# Patient Record
Sex: MALE | Race: WHITE | NOT HISPANIC OR LATINO | Employment: STUDENT | URBAN - METROPOLITAN AREA
[De-identification: names, ages, dates, MRNs, and addresses within clinical notes are randomized per-mention and may not be internally consistent; named-entity substitution may affect disease eponyms.]

---

## 2017-08-16 DIAGNOSIS — N50.89 TESTICULAR MASS: Primary | ICD-10-CM

## 2017-08-18 ENCOUNTER — HOSPITAL ENCOUNTER (OUTPATIENT)
Dept: RADIOLOGY | Facility: HOSPITAL | Age: 27
Discharge: HOME | End: 2017-08-18
Attending: FAMILY MEDICINE
Payer: MEDICAID

## 2017-08-18 DIAGNOSIS — N50.89 TESTICULAR MASS: ICD-10-CM

## 2017-08-18 PROCEDURE — 76870 US EXAM SCROTUM: CPT | Mod: TC

## 2021-04-09 ENCOUNTER — IMMUNIZATION (OUTPATIENT)
Dept: INTERNAL MEDICINE | Facility: CLINIC | Age: 31
End: 2021-04-09

## 2021-04-09 DIAGNOSIS — Z23 NEED FOR VACCINATION: Primary | ICD-10-CM

## 2021-04-09 PROCEDURE — 91300 COVID-19, MRNA, LNP-S, PF, 30 MCG/0.3 ML DOSE VACCINE: ICD-10-PCS | Mod: S$GLB,,, | Performed by: FAMILY MEDICINE

## 2021-04-09 PROCEDURE — 0001A COVID-19, MRNA, LNP-S, PF, 30 MCG/0.3 ML DOSE VACCINE: ICD-10-PCS | Mod: CV19,S$GLB,, | Performed by: FAMILY MEDICINE

## 2021-04-09 PROCEDURE — 91300 COVID-19, MRNA, LNP-S, PF, 30 MCG/0.3 ML DOSE VACCINE: CPT | Mod: S$GLB,,, | Performed by: FAMILY MEDICINE

## 2021-04-09 PROCEDURE — 0001A COVID-19, MRNA, LNP-S, PF, 30 MCG/0.3 ML DOSE VACCINE: CPT | Mod: CV19,S$GLB,, | Performed by: FAMILY MEDICINE

## 2021-04-30 ENCOUNTER — IMMUNIZATION (OUTPATIENT)
Dept: INTERNAL MEDICINE | Facility: CLINIC | Age: 31
End: 2021-04-30

## 2021-04-30 DIAGNOSIS — Z23 NEED FOR VACCINATION: Primary | ICD-10-CM

## 2021-04-30 PROCEDURE — 91300 COVID-19, MRNA, LNP-S, PF, 30 MCG/0.3 ML DOSE VACCINE: CPT | Mod: ,,, | Performed by: FAMILY MEDICINE

## 2021-04-30 PROCEDURE — 0002A COVID-19, MRNA, LNP-S, PF, 30 MCG/0.3 ML DOSE VACCINE: ICD-10-PCS | Mod: CV19,,, | Performed by: FAMILY MEDICINE

## 2021-04-30 PROCEDURE — 91300 COVID-19, MRNA, LNP-S, PF, 30 MCG/0.3 ML DOSE VACCINE: ICD-10-PCS | Mod: ,,, | Performed by: FAMILY MEDICINE

## 2021-04-30 PROCEDURE — 0002A COVID-19, MRNA, LNP-S, PF, 30 MCG/0.3 ML DOSE VACCINE: CPT | Mod: CV19,,, | Performed by: FAMILY MEDICINE

## 2021-11-15 ENCOUNTER — OFFICE VISIT (OUTPATIENT)
Dept: INTERNAL MEDICINE | Facility: CLINIC | Age: 31
End: 2021-11-15
Payer: COMMERCIAL

## 2021-11-15 VITALS
BODY MASS INDEX: 24.17 KG/M2 | HEART RATE: 81 BPM | TEMPERATURE: 97 F | RESPIRATION RATE: 16 BRPM | SYSTOLIC BLOOD PRESSURE: 118 MMHG | WEIGHT: 159.5 LBS | DIASTOLIC BLOOD PRESSURE: 70 MMHG | OXYGEN SATURATION: 97 % | HEIGHT: 68 IN

## 2021-11-15 DIAGNOSIS — Z11.59 NEED FOR HEPATITIS C SCREENING TEST: ICD-10-CM

## 2021-11-15 DIAGNOSIS — Z11.4 SCREENING FOR HIV (HUMAN IMMUNODEFICIENCY VIRUS): ICD-10-CM

## 2021-11-15 DIAGNOSIS — Z13.29 THYROID DISORDER SCREEN: ICD-10-CM

## 2021-11-15 DIAGNOSIS — Z23 NEED FOR TDAP VACCINATION: Primary | ICD-10-CM

## 2021-11-15 DIAGNOSIS — Z00.00 ROUTINE GENERAL MEDICAL EXAMINATION AT A HEALTH CARE FACILITY: ICD-10-CM

## 2021-11-15 DIAGNOSIS — Z13.220 SCREENING FOR LIPOID DISORDERS: ICD-10-CM

## 2021-11-15 PROCEDURE — 99999 PR PBB SHADOW E&M-EST. PATIENT-LVL III: CPT | Mod: PBBFAC,,, | Performed by: PHYSICIAN ASSISTANT

## 2021-11-15 PROCEDURE — 99385 PREV VISIT NEW AGE 18-39: CPT | Mod: 25,S$GLB,, | Performed by: PHYSICIAN ASSISTANT

## 2021-11-15 PROCEDURE — 1159F PR MEDICATION LIST DOCUMENTED IN MEDICAL RECORD: ICD-10-PCS | Mod: CPTII,S$GLB,, | Performed by: PHYSICIAN ASSISTANT

## 2021-11-15 PROCEDURE — 90471 IMMUNIZATION ADMIN: CPT | Mod: S$GLB,,, | Performed by: PHYSICIAN ASSISTANT

## 2021-11-15 PROCEDURE — 1159F MED LIST DOCD IN RCRD: CPT | Mod: CPTII,S$GLB,, | Performed by: PHYSICIAN ASSISTANT

## 2021-11-15 PROCEDURE — 3074F PR MOST RECENT SYSTOLIC BLOOD PRESSURE < 130 MM HG: ICD-10-PCS | Mod: CPTII,S$GLB,, | Performed by: PHYSICIAN ASSISTANT

## 2021-11-15 PROCEDURE — 99385 PR PREVENTIVE VISIT,NEW,18-39: ICD-10-PCS | Mod: 25,S$GLB,, | Performed by: PHYSICIAN ASSISTANT

## 2021-11-15 PROCEDURE — 90715 TDAP VACCINE GREATER THAN OR EQUAL TO 7YO IM: ICD-10-PCS | Mod: S$GLB,,, | Performed by: PHYSICIAN ASSISTANT

## 2021-11-15 PROCEDURE — 3078F DIAST BP <80 MM HG: CPT | Mod: CPTII,S$GLB,, | Performed by: PHYSICIAN ASSISTANT

## 2021-11-15 PROCEDURE — 3008F PR BODY MASS INDEX (BMI) DOCUMENTED: ICD-10-PCS | Mod: CPTII,S$GLB,, | Performed by: PHYSICIAN ASSISTANT

## 2021-11-15 PROCEDURE — 90471 TDAP VACCINE GREATER THAN OR EQUAL TO 7YO IM: ICD-10-PCS | Mod: S$GLB,,, | Performed by: PHYSICIAN ASSISTANT

## 2021-11-15 PROCEDURE — 3078F PR MOST RECENT DIASTOLIC BLOOD PRESSURE < 80 MM HG: ICD-10-PCS | Mod: CPTII,S$GLB,, | Performed by: PHYSICIAN ASSISTANT

## 2021-11-15 PROCEDURE — 90715 TDAP VACCINE 7 YRS/> IM: CPT | Mod: S$GLB,,, | Performed by: PHYSICIAN ASSISTANT

## 2021-11-15 PROCEDURE — 1160F PR REVIEW ALL MEDS BY PRESCRIBER/CLIN PHARMACIST DOCUMENTED: ICD-10-PCS | Mod: CPTII,S$GLB,, | Performed by: PHYSICIAN ASSISTANT

## 2021-11-15 PROCEDURE — 3074F SYST BP LT 130 MM HG: CPT | Mod: CPTII,S$GLB,, | Performed by: PHYSICIAN ASSISTANT

## 2021-11-15 PROCEDURE — 99999 PR PBB SHADOW E&M-EST. PATIENT-LVL III: ICD-10-PCS | Mod: PBBFAC,,, | Performed by: PHYSICIAN ASSISTANT

## 2021-11-15 PROCEDURE — 3008F BODY MASS INDEX DOCD: CPT | Mod: CPTII,S$GLB,, | Performed by: PHYSICIAN ASSISTANT

## 2021-11-15 PROCEDURE — 1160F RVW MEDS BY RX/DR IN RCRD: CPT | Mod: CPTII,S$GLB,, | Performed by: PHYSICIAN ASSISTANT

## 2021-11-15 RX ORDER — MINERAL OIL
180 ENEMA (ML) RECTAL DAILY
COMMUNITY

## 2021-11-16 ENCOUNTER — LAB VISIT (OUTPATIENT)
Dept: LAB | Facility: HOSPITAL | Age: 31
End: 2021-11-16
Attending: PHYSICIAN ASSISTANT
Payer: COMMERCIAL

## 2021-11-16 DIAGNOSIS — Z00.00 ROUTINE GENERAL MEDICAL EXAMINATION AT A HEALTH CARE FACILITY: ICD-10-CM

## 2021-11-16 DIAGNOSIS — Z13.220 SCREENING FOR LIPOID DISORDERS: ICD-10-CM

## 2021-11-16 DIAGNOSIS — Z11.59 NEED FOR HEPATITIS C SCREENING TEST: ICD-10-CM

## 2021-11-16 DIAGNOSIS — Z11.4 SCREENING FOR HIV (HUMAN IMMUNODEFICIENCY VIRUS): ICD-10-CM

## 2021-11-16 DIAGNOSIS — Z13.29 THYROID DISORDER SCREEN: ICD-10-CM

## 2021-11-16 LAB
ALBUMIN SERPL BCP-MCNC: 4.2 G/DL (ref 3.5–5.2)
ALP SERPL-CCNC: 50 U/L (ref 55–135)
ALT SERPL W/O P-5'-P-CCNC: 25 U/L (ref 10–44)
ANION GAP SERPL CALC-SCNC: 13 MMOL/L (ref 8–16)
AST SERPL-CCNC: 25 U/L (ref 10–40)
BASOPHILS # BLD AUTO: 0.06 K/UL (ref 0–0.2)
BASOPHILS NFR BLD: 0.9 % (ref 0–1.9)
BILIRUB SERPL-MCNC: 0.5 MG/DL (ref 0.1–1)
BUN SERPL-MCNC: 18 MG/DL (ref 6–20)
CALCIUM SERPL-MCNC: 9.9 MG/DL (ref 8.7–10.5)
CHLORIDE SERPL-SCNC: 104 MMOL/L (ref 95–110)
CHOLEST SERPL-MCNC: 179 MG/DL (ref 120–199)
CHOLEST/HDLC SERPL: 2.8 {RATIO} (ref 2–5)
CO2 SERPL-SCNC: 25 MMOL/L (ref 23–29)
CREAT SERPL-MCNC: 0.9 MG/DL (ref 0.5–1.4)
DIFFERENTIAL METHOD: ABNORMAL
EOSINOPHIL # BLD AUTO: 0.1 K/UL (ref 0–0.5)
EOSINOPHIL NFR BLD: 1.4 % (ref 0–8)
ERYTHROCYTE [DISTWIDTH] IN BLOOD BY AUTOMATED COUNT: 13 % (ref 11.5–14.5)
EST. GFR  (AFRICAN AMERICAN): >60 ML/MIN/1.73 M^2
EST. GFR  (NON AFRICAN AMERICAN): >60 ML/MIN/1.73 M^2
GLUCOSE SERPL-MCNC: 83 MG/DL (ref 70–110)
HCT VFR BLD AUTO: 43.1 % (ref 40–54)
HCV AB SERPL QL IA: NEGATIVE
HDLC SERPL-MCNC: 65 MG/DL (ref 40–75)
HDLC SERPL: 36.3 % (ref 20–50)
HGB BLD-MCNC: 13.7 G/DL (ref 14–18)
HIV 1+2 AB+HIV1 P24 AG SERPL QL IA: NEGATIVE
IMM GRANULOCYTES # BLD AUTO: 0.05 K/UL (ref 0–0.04)
IMM GRANULOCYTES NFR BLD AUTO: 0.8 % (ref 0–0.5)
LDLC SERPL CALC-MCNC: 95.6 MG/DL (ref 63–159)
LYMPHOCYTES # BLD AUTO: 2 K/UL (ref 1–4.8)
LYMPHOCYTES NFR BLD: 32 % (ref 18–48)
MCH RBC QN AUTO: 28.8 PG (ref 27–31)
MCHC RBC AUTO-ENTMCNC: 31.8 G/DL (ref 32–36)
MCV RBC AUTO: 91 FL (ref 82–98)
MONOCYTES # BLD AUTO: 0.4 K/UL (ref 0.3–1)
MONOCYTES NFR BLD: 5.7 % (ref 4–15)
NEUTROPHILS # BLD AUTO: 3.8 K/UL (ref 1.8–7.7)
NEUTROPHILS NFR BLD: 59.2 % (ref 38–73)
NONHDLC SERPL-MCNC: 114 MG/DL
NRBC BLD-RTO: 0 /100 WBC
PLATELET # BLD AUTO: 199 K/UL (ref 150–450)
PMV BLD AUTO: 11.9 FL (ref 9.2–12.9)
POTASSIUM SERPL-SCNC: 4.4 MMOL/L (ref 3.5–5.1)
PROT SERPL-MCNC: 7 G/DL (ref 6–8.4)
RBC # BLD AUTO: 4.75 M/UL (ref 4.6–6.2)
SODIUM SERPL-SCNC: 142 MMOL/L (ref 136–145)
TRIGL SERPL-MCNC: 92 MG/DL (ref 30–150)
TSH SERPL DL<=0.005 MIU/L-ACNC: 1.84 UIU/ML (ref 0.4–4)
WBC # BLD AUTO: 6.34 K/UL (ref 3.9–12.7)

## 2021-11-16 PROCEDURE — 84443 ASSAY THYROID STIM HORMONE: CPT | Performed by: PHYSICIAN ASSISTANT

## 2021-11-16 PROCEDURE — 85025 COMPLETE CBC W/AUTO DIFF WBC: CPT | Performed by: PHYSICIAN ASSISTANT

## 2021-11-16 PROCEDURE — 86803 HEPATITIS C AB TEST: CPT | Performed by: PHYSICIAN ASSISTANT

## 2021-11-16 PROCEDURE — 87389 HIV-1 AG W/HIV-1&-2 AB AG IA: CPT | Performed by: PHYSICIAN ASSISTANT

## 2021-11-16 PROCEDURE — 80053 COMPREHEN METABOLIC PANEL: CPT | Performed by: PHYSICIAN ASSISTANT

## 2021-11-16 PROCEDURE — 80061 LIPID PANEL: CPT | Performed by: PHYSICIAN ASSISTANT

## 2021-11-16 PROCEDURE — 36415 COLL VENOUS BLD VENIPUNCTURE: CPT | Mod: PO | Performed by: PHYSICIAN ASSISTANT

## 2024-04-04 ENCOUNTER — HOSPITAL ENCOUNTER (OUTPATIENT)
Dept: RADIOLOGY | Facility: HOSPITAL | Age: 34
Discharge: HOME | End: 2024-04-04
Attending: ORTHOPAEDIC SURGERY
Payer: COMMERCIAL

## 2024-04-04 ENCOUNTER — OFFICE VISIT (OUTPATIENT)
Dept: SPORTS MEDICINE | Facility: CLINIC | Age: 34
End: 2024-04-04
Payer: COMMERCIAL

## 2024-04-04 VITALS — HEIGHT: 68 IN | BODY MASS INDEX: 24.25 KG/M2 | WEIGHT: 160 LBS

## 2024-04-04 DIAGNOSIS — G89.29 CHRONIC PAIN OF RIGHT ANKLE: Primary | ICD-10-CM

## 2024-04-04 DIAGNOSIS — M76.61 ACHILLES TENDINITIS OF RIGHT LOWER EXTREMITY: ICD-10-CM

## 2024-04-04 DIAGNOSIS — M25.571 CHRONIC PAIN OF RIGHT ANKLE: Primary | ICD-10-CM

## 2024-04-04 DIAGNOSIS — M25.571 RIGHT ANKLE PAIN, UNSPECIFIED CHRONICITY: ICD-10-CM

## 2024-04-04 PROCEDURE — 99204 OFFICE O/P NEW MOD 45 MIN: CPT | Mod: S$GLB,,, | Performed by: ORTHOPAEDIC SURGERY

## 2024-04-04 PROCEDURE — 73610 X-RAY EXAM OF ANKLE: CPT | Mod: TC,PN,RT

## 2024-04-04 PROCEDURE — 73610 X-RAY EXAM OF ANKLE: CPT | Mod: 26,RT,, | Performed by: RADIOLOGY

## 2024-04-04 PROCEDURE — 99999 PR PBB SHADOW E&M-EST. PATIENT-LVL III: CPT | Mod: PBBFAC,,, | Performed by: ORTHOPAEDIC SURGERY

## 2024-04-04 PROCEDURE — 3008F BODY MASS INDEX DOCD: CPT | Mod: CPTII,S$GLB,, | Performed by: ORTHOPAEDIC SURGERY

## 2024-04-04 NOTE — PROGRESS NOTES
Patient ID: Ryan Rivera  YOB: 1990  MRN: [id number]    Chief Complaint: Pain of the Right Ankle    Referred By: Pema Rivera wife, previous surgical patient    History of Present Illness: Ryan Rivera is a  33 y.o. male    with a chief complaint of Pain of the Right Ankle    Ryan presents today with right posterior ankle pain. He denies any injury, but recently started running in December. He reports the pain began about a month ago. He reports running a 5k two weeks ago and the pain increased. He reports the pain is posterior near his heel. He has tried OTC NSAIDs on and off. He did take two weeks off of running after the 5k and rested/iced the area. He reports he tried getting back into running on Tuesday and had pain again in his heel. He reports it as an irritation and is worst early or before activity, but gets better with a warm up.     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     Past Surgical History:   Procedure Laterality Date    lateral orchiectomy  2017    TONSILLECTOMY  1995     Family History   Problem Relation Age of Onset    Heart disease Father     Thyroid disease Mother      Social History     Socioeconomic History    Marital status:    Tobacco Use    Smoking status: Never    Smokeless tobacco: Never   Substance and Sexual Activity    Alcohol use: Yes     Alcohol/week: 1.0 standard drink of alcohol     Types: 1 Glasses of wine per week    Drug use: Never    Sexual activity: Yes     Partners: Female     Medication List with Changes/Refills   New Medications    BENZONATATE (TESSALON PERLES) 100 MG CAPSULE    1-2 capsules every 8 hours as needed for cough    OSELTAMIVIR (TAMIFLU) 75 MG CAPSULE    Take 1 capsule (75 mg total) by mouth once daily. for 10 days   Current Medications    FEXOFENADINE (ALLEGRA) 180 MG TABLET    Take 180 mg by mouth once daily.      Review of patient's allergies indicates:   Allergen Reactions    Adhesive Rash     ROS    Physical Exam:   Body mass index is 24.33 kg/m².  There were no vitals filed for this visit.   GENERAL: Well appearing, appropriate for stated age, no acute distress.  CARDIOVASCULAR: Pulses regular by peripheral palpation.  PULMONARY: Respirations are even and non-labored.  NEURO: Awake, alert, and oriented x 3.  PSYCH: Mood & affect are appropriate.  HEENT: Head is normocephalic and atraumatic.  Ortho/SPM Exam      Imaging:    X-Ray Ankle Complete Right  Narrative: EXAM:  XR ANKLE COMPLETE 3 VIEW RIGHT    CLINICAL HISTORY:  Right ankle pain    FINDINGS:  3 views.  No acute fracture or dislocation.  Impression:  Unremarkable study.    Finalized on: 4/4/2024 10:11 AM By:  Cole Pike MD  [facility]# [id number]      2024-04-04 10:13:22.826    [facility]      Relevant imaging results reviewed and interpreted by me, discussed with the patient and / or family today.     Other Tests:         Patient Instructions   Assessment:  Ryan Rivera is a  33 y.o. male    with a chief complaint of Pain of the Right Ankle    Right insertionally achilles tendonitis     Encounter Diagnoses   Name Primary?    Chronic pain of right ankle Yes    Achilles tendinitis of right lower extremity       Plan:  Order PT at [facility] with Julian for eval. Okay to transition to the [facility] for therapy with Julian's plan   Advised to run as symptoms allow   Apply Voltaren OTC Cream to the affected area twice daily.  Discussed possible MRI if no improvement. Advised to get back in 2 months of therapy if no improvement.     Follow-up: As needed or sooner if there are any problems between now and then.    Leave Review:   Google: Leave Google Review  Healthgrades: Leave Healthgrades Review    After Hours Number: [phone]       Provider Note/Medical Decision Making:       I discussed worrisome and red flag signs and symptoms with the patient. The patient expressed  understanding and agreed to alert me immediately or to go to the emergency room if they experience any of these.   Treatment plan was developed with input from the patient/family, and they expressed understanding and agreement with the plan. All questions were answered today.          Ronal Bocanegra MD  Orthopaedic Surgery & Sports Medicine       Disclaimer: This note was prepared using a voice recognition system and is likely to have sound alike errors within the text.     I, Shirley Arias, acted as a scribe for Ronal Bocanegra MD for the duration of this office visit.

## 2024-04-04 NOTE — PATIENT INSTRUCTIONS
Assessment:  Ryan Rivera is a  33 y.o. male    with a chief complaint of Pain of the Right Ankle    Right insertionally achilles tendonitis     Encounter Diagnoses   Name Primary?    Chronic pain of right ankle Yes    Achilles tendinitis of right lower extremity       Plan:  Order PT at [facility] with Julian for eval. Okay to transition to the [facility] for therapy with Julian's plan   Advised to run as symptoms allow   Apply Voltaren OTC Cream to the affected area twice daily.  Discussed possible MRI if no improvement. Advised to get back in 2 months of therapy if no improvement.     Follow-up: As needed or sooner if there are any problems between now and then.    Leave Review:   Google: Leave Google Review  Healthgrades: Leave Healthgrades Review    After Hours Number: [phone]

## 2024-04-05 ENCOUNTER — ON-DEMAND VIRTUAL (OUTPATIENT)
Dept: URGENT CARE | Facility: CLINIC | Age: 34
End: 2024-04-05
Payer: COMMERCIAL

## 2024-04-05 DIAGNOSIS — R50.9 FEVER, UNSPECIFIED FEVER CAUSE: ICD-10-CM

## 2024-04-05 DIAGNOSIS — R05.9 COUGH, UNSPECIFIED TYPE: ICD-10-CM

## 2024-04-05 DIAGNOSIS — R09.89 SUSPECTED NOVEL INFLUENZA A VIRUS INFECTION: Primary | ICD-10-CM

## 2024-04-05 PROCEDURE — 99213 OFFICE O/P EST LOW 20 MIN: CPT | Mod: 95,,, | Performed by: FAMILY MEDICINE

## 2024-04-05 RX ORDER — OSELTAMIVIR PHOSPHATE 75 MG/1
75 CAPSULE ORAL DAILY
Qty: 10 CAPSULE | Refills: 0 | Status: SHIPPED | OUTPATIENT
Start: 2024-04-05 | End: 2024-04-15

## 2024-04-05 RX ORDER — BENZONATATE 100 MG/1
CAPSULE ORAL
Qty: 30 CAPSULE | Refills: 0 | Status: SHIPPED | OUTPATIENT
Start: 2024-04-05

## 2024-04-05 NOTE — LETTER
April 5, 2024    Ryan Rivera  [address] [place] [address]             Virtual Visit - Urgent Care  Urgent Care  [address] [id number]   April 5, 2024     Patient: Ryan Rivera   YOB: 1990   Date of Visit: 4/5/2024       To Whom it May Concern:    Ryan Rivera was seen virtually on 4/5/2024. He may return to work on 4/7/2024 as long as he is without a fever, and symptoms are generally improved. .    Please excuse him from any classes or work missed.    If you have any questions or concerns, please don't hesitate to call.    Sincerely,            Abigail Tobias MD

## 2024-04-05 NOTE — PROGRESS NOTES
Subjective:      Patient ID: Ryan Rivera is a 33 y.o. male.    Vitals:  vitals were not taken for this visit.     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       No current facility-administered medications on file prior to visit.     Family History   Problem Relation Age of Onset    Heart disease Father     Thyroid disease Mother        Medications Ordered                [id number] [place] - [address] AT [facility]   [address] [address]    Telephone: [phone]   Fax: [phone]   Hours: Not open 24 hours                         E-Prescribed (2 of 2)              benzonatate (TESSALON PERLES) 100 MG capsule    Si-2 capsules every 8 hours as needed for cough       Start: 24     Quantity: 30 capsule Refills: 0                         oseltamivir (TAMIFLU) 75 MG capsule    Sig: Take 1 capsule (75 mg total) by mouth once daily. for 10 days       Start: 24     Quantity: 10 capsule Refills: 0                           Ohs Peq Odvv Intake    2024  6:31 AM CDT - Filed by Patient   What is your current physical address in the event of a medical emergency? [address] [place] [address]   Are you able to take your vital signs? No   Please attach any relevant images or files          33-year-old male who suspects he has the flu.  He reports a just over 24 hour history of dry  cough and aches and fever up to 101.  No known exposures to COVID or flu that he is aware.  Negative past medical history.  No history of diabetes or hypertension or asthma.  He reports cough is minimal.  No sputum or colored nasal drainage.  Only medication is Allegra p.r.n..  Also taking aspirin 81 mg 2 pills 4 times daily in spots to the flu.  No known drug allergies.          Constitution: Positive for fever.        Objective:   The physical exam was conducted virtually.  Physical Exam   Constitutional: He is oriented to person, place, and time. He appears well-developed.  Non-toxic appearance. No distress.      Comments:Appears fatigued.     HENT:   Head: Normocephalic and atraumatic.   Ears:   Right Ear: External ear normal.   Left Ear: External ear normal.   Mouth/Throat:      Comments: No tenderness over sinuses.  Pulmonary/Chest: Effort normal. No stridor. No respiratory distress.   Neurological: He is alert and oriented to person, place, and time.   Skin: Skin is not diaphoretic.   Psychiatric: His behavior is normal. Thought content normal.   Nursing note and vitals reviewed.      Assessment:     1. Suspected novel influenza A virus infection    2. Fever, unspecified fever cause    3. Cough, unspecified type        Plan:       Suspected novel influenza A virus infection  -     oseltamivir (TAMIFLU) 75 MG capsule; Take 1 capsule (75 mg total) by mouth once daily. for 10 days  Dispense: 10 capsule; Refill: 0    Fever, unspecified fever cause    Cough, unspecified type  -     benzonatate (TESSALON PERLES) 100 MG capsule; 1-2 capsules every 8 hours as needed for cough  Dispense: 30 capsule; Refill: 0    AS WE DISCUSSED, I CONCUR THAT YOU LIKELY HAVE THE FLU OR INFLUENZA.  HOWEVER, I WOULD LIKE YOU TO PERFORM A HOME COVID TEST WHICH YOU CAN GET FROM THE PHARMACY OR PERHAPS A FRIEND.  IF THE COVID TEST IS NEGATIVE, AT THAT POINT PROCEED  WITH TREATMENT OF INFLUENZA WITH THE ANTIVIRAL TAMIFLU, WHICH I SENT TO  THE PHARMACY.    I ALSO RECOMMEND THAT YOU  USE IBUPROFEN 600 MG EVERY 6 HOURS AS NEEDED FOR FEVER OR ACHES OR HEADACHE.    Make sure that you follow up with your primary care doctor in the next 2-5 days if needed .  Go to or return to Urgent Care if signs or symptoms change and certainly if you have worsening and/or severe symptoms go to the nearest emergency department for further evaluation.

## 2024-04-05 NOTE — PATIENT INSTRUCTIONS
AS WE DISCUSSED, I CONCUR THAT YOU LIKELY HAVE THE FLU OR INFLUENZA.  HOWEVER, I WOULD LIKE YOU TO PERFORM A HOME COVID TEST WHICH YOU CAN GET FROM THE PHARMACY OR PERHAPS A FRIEND.  IF THE COVID TEST IS NEGATIVE, AT THAT POINT PROCEED  WITH TREATMENT OF INFLUENZA WITH THE ANTIVIRAL TAMIFLU, WHICH I SENT TO THE PHARMACY.    I ALSO RECOMMEND THAT YOU  USE IBUPROFEN 600 MG EVERY 6 HOURS AS NEEDED FOR FEVER OR ACHES OR HEADACHE.    Make sure that you follow up with your primary care doctor in the next 2-5 days if needed .  Go to or return to Urgent Care if signs or symptoms change and certainly if you have worsening and/or severe symptoms go to the nearest emergency department for further evaluation.

## 2024-04-08 ENCOUNTER — CLINICAL SUPPORT (OUTPATIENT)
Facility: HOSPITAL | Age: 34
End: 2024-04-08
Attending: ORTHOPAEDIC SURGERY
Payer: COMMERCIAL

## 2024-04-08 DIAGNOSIS — R52 PAIN AGGRAVATED BY ACTIVITIES OF DAILY LIVING: ICD-10-CM

## 2024-04-08 DIAGNOSIS — M25.671 DECREASED RANGE OF MOTION OF RIGHT ANKLE: Primary | ICD-10-CM

## 2024-04-08 DIAGNOSIS — M76.61 ACHILLES TENDINITIS OF RIGHT LOWER EXTREMITY: ICD-10-CM

## 2024-04-08 DIAGNOSIS — G89.29 CHRONIC PAIN OF RIGHT ANKLE: ICD-10-CM

## 2024-04-08 DIAGNOSIS — M25.571 CHRONIC PAIN OF RIGHT ANKLE: ICD-10-CM

## 2024-04-08 PROCEDURE — 97110 THERAPEUTIC EXERCISES: CPT | Mod: PN | Performed by: PHYSICAL THERAPIST

## 2024-04-08 PROCEDURE — 97161 PT EVAL LOW COMPLEX 20 MIN: CPT | Mod: PN | Performed by: PHYSICAL THERAPIST

## 2024-04-08 PROCEDURE — 97140 MANUAL THERAPY 1/> REGIONS: CPT | Mod: PN | Performed by: PHYSICAL THERAPIST

## 2024-04-08 NOTE — PROGRESS NOTES
[facility]   Physical Therapy Initial Evaluation      Name: Ryan Rivera  Clinic Number: [id number]    Therapy Diagnosis:   Encounter Diagnoses   Name Primary?    Chronic pain of right ankle     Achilles tendinitis of right lower extremity         Physician: Ronal Bocanegra MD    Physician Orders: PT Eval and Treat   Medical Diagnosis from Referral: Chronic pain of right ankle [M25.571, G89.29], Achilles tendinitis of right lower extremity [M76.61]   Evaluation Date: 4/8/2024  Authorization Period Expiration: 12/31/24  Plan of Care Expiration: 7/8/24  Progress Note Due: 5/8/24  Visit # / Visits authorized: 1/ 1     FOTO:  Goal: 89%  -Intake: 75%  -Status: incomplete  -Discharge: incomplete    Precautions: Standard     Time In: ***  Time Out: ***  Total Appointment Time (timed & untimed codes): *** minutes    Subjective     Date of onset: December    History of current condition - Ryan reports: Started burning again in December Running 1x per week at [employer]. Also doing orange theory 2 days per week. About a month ago he was doing more running, about 10 miles per week. He ran corporate cup he had pain in the back of the heel. Gets better after warming up. Last Tuesday went back to [employer].    He also plays Beach Volleyball. Moving in the sand is problematic.     Takes ibuprofen. No recent antibiotics.    Falls: ***    Imaging: [x] Xray [] MRI [] CT: Performed on:   CLINICAL HISTORY:  Right ankle pain     FINDINGS:  3 views.  No acute fracture or dislocation.       Pain:  Current 0/10, worst 2/10, best 0/10   Location: [x] Right   [] Left:  Achilles  Description: Tightness  Aggravating Factors: Running, first thing in the morning  Easing Factors: activity avoidance, rest    Prior Therapy: For shoulder  Social History:    Occupation:  [employer]  Prior Level of Function: ***  Current Level of Function: ***    Patients goals: ***     Medical History:   Past Medical  History:   Diagnosis Date    Cancer 2017    Testicular ca; followed by Dr. Connell       Surgical History:   Ryan Rivera  has a past surgical history that includes Tonsillectomy (1995) and lateral orchiectomy (2017).    Medications:   Ryan has a current medication list which includes the following prescription(s): benzonatate, fexofenadine, and oseltamivir.    Allergies:   Review of patient's allergies indicates:   Allergen Reactions    Adhesive Rash        Objective      Observation:   POSTURE:    Mild pes planus  GAIT:   R foot abductory twist      FUNCTIONAL MOVEMENT PATTERNS  Movement Analysis Notes   Squat [x]Functional  []Dysfunctional:  []Painful  [x]Non-Painful            Single-leg step down assessment: (0-1 good quality of movement, 2-3 moderate, 4+ poor)         LLE RLE   1. Hands off hips   []  []    2. Trunk shift    []  [x]    3. Pelvic drop    []  []    4. Knee medial to 2nd toe  [x]  [x]    5. Lift off of medial foot  []  []    6. Loss of balance   []  [x]            1/6 3/6        Balance:    RIGHT   LEFT   Goal   Rhomberg ***  60 seconds  Initial: R (***s), L (***s)   Tandem *** *** 30 seconds  Initial: R (***s), L (***s)   Single Leg *** *** 30 seconds  Initial: R (***s), L (***s)    ,   Lower extremity reflexes:  Reflex Left Right   Patella (L2-4) ***+ ***+   Hamstring (L5) ***+ ***+   Achilles (S1) ***+ ***+   Gonzalez Negative Negative   Babinski Negative Negative   Clonus Negative Negative     ,   Lower extremity myotomes  Neuro Testing Right   Left     L2 (hip flexion) ***/5 ***/5   L3 (knee extension) ***/5 ***/5   L4 (ankle DF) ***/5 ***/5   L5 (great toe extension) ***/5 ***/5   S1 (plantarflexion) ***/5 ***/5    ,     Lower  Limb Neurodynamic testing:   Right Left   SLR *** ***   Tibial SLR *** ***   Sural SLR *** ***   Superficial Fibular SLR *** ***   Slump test *** ***   Femoral *** ***        RANGE OF MOTION:   Lumbar ROM Right  (spine) Left   Pain/Dysfunction with Movement  "Goal   Lumbar Flexion (60º) 100% ---     Lumbar Extension (30º) 100% ---     Lumbar Rotation 100% 100%         Hip AROM/PROM Right Left Pain/Dysfunction with Movement Goal   Hip Flexion (120º)       Hip Extension (30º)       Hip Abduction (45º)       Hip IR (45º)       Hip ER (45º)           Knee AROM/PROM Right Left Pain/Dysfunction with Movement Goal   Knee Flexion (135º)       Knee Extension (0º)       Tibial External Rotation (40º}       Tibial Internal Rotation (30º)           Ankle/Foot AROM/PROM Right Left Pain/Dysfunction with Movement Goal   Dorsiflexion (20º OKC, 4" CKC) 3" CKC 2" CKC     Plantarflexion (50º)       Inversion (35º)       Eversion (15º)            STRENGTH:   L/E MMT Right  (spine) Left Pain/Dysfunction with Movement Goal   Hip Flexion  {MMT SCORES:34204} {MMT SCORES:73823}  4+/5 B   Hip Extension  {MMT SCORES:41416} {MMT SCORES:84730}  4+/5 B   Hip Abduction  {MMT SCORES:15857} {MMT SCORES:47518}  4+/5 B   Knee Extension {MMT SCORES:78795} {MMT SCORES:85660}  5/5 B   Knee Flexion {MMT SCORES:80765} {MMT SCORES:32068}  5/5 B   Hip IR {MMT SCORES:71746} {MMT SCORES:07177}  4+/5 B   Hip ER {MMT SCORES:91524} {MMT SCORES:21427}  4+/5 B   Ankle DF {MMT SCORES:94182} {MMT SCORES:12269}  5/5 B   Ankle PF 30 reps 26 reps  5/5 B   Ankle Inversion {MMT SCORES:76936} {MMT SCORES:15221}  5/5 B   Ankle Eversion {MMT SCORES:75644} {MMT SCORES:29558}  5/5 B        MUSCLE LENGTH:   Muscle Tested  Right Left  Limitation Goal   Hip Flexors [] Normal  [] Limited [] Normal  [] Limited  Normal B   ITB / TFL [] Normal  [] Limited [] Normal  [] Limited  Normal B   Quadriceps [] Normal  [] Limited [] Normal  [] Limited  Normal B   Hamstrings  [] Normal  [] Limited [] Normal  [] Limited  Normal B   Piriformis [] Normal  [] Limited [] Normal  [] Limited  Normal B   Gastrocnemius  [] Normal  [] Limited [] Normal  [] Limited  Normal B         Joint mobility:  ***      {SPECIAL TESTS (Optional):39474}      SENSATION  " "[x] Intact to Light Touch   [] Impaired:      PALPATION: Structures: Increased tenderness to palpation of: {RIGHT/LEFT:41939} ***        Function:     Intake Outcome Measure for FOTO *** Survey    Therapist reviewed FOTO scores for Ryan Rivera on 4/8/2024.   FOTO documents entered into EPIC - see Media section.    Intake Score: ***%         Treatment     Total Treatment time (time-based codes) separate from Evaluation: (***) minutes     Ryan received the treatments listed below:      Ryan received the following manual therapy techniques: Joint mobilizations and Soft tissue Mobilization were applied to the: *** for *** minutes, including:      Ryan received therapeutic exercises to develop strength, endurance, ROM, flexibility, posture, and core stabilization for *** minutes including:      Ryan participated in neuromuscular re-education activities to improve: Balance, Coordination, Kinesthetic, Sense, Proprioception, and Posture for *** minutes. The following activities were included:      Ryan participated in dynamic functional therapeutic activities to improve functional performance for ***  minutes, including:      Patient Education and Home Exercises     Education provided: (***) minutes  {PATIENT EDUCATION (Optional):93038}  Activity Modifications: ***    Written Home Exercises Provided: yes.  Exercises were reviewed and Ryan was able to demonstrate them prior to the end of the session.  Ryan demonstrated {Desc; good/fair/poor:81179} understanding of the education provided. See EMR under Patient Instructions for exercises provided during therapy sessions.    Assessment     Ryan is a 33 y.o. male referred to outpatient Physical Therapy with a medical diagnosis of ***. Clinical exam is consistent with ***.     Problem List:  ***    Pt prognosis is {REHAB PROGNOSIS OHS:68077::"Excellent"}  Pt will benefit from skilled outpatient Physical Therapy to address the deficits stated above " "and in the chart below, provide pt/family education, and to maximize pt's level of independence.     Plan of care discussed with patient: Yes  Pt's spiritual, cultural and educational needs considered and patient is agreeable to the plan of care and goals as stated below:     Anticipated Barriers for therapy: {barriers for care:63726}    Medical Necessity is demonstrated by the following ***  History  Co-morbidities and personal factors that may impact the plan of care [] LOW: no personal factors / co-morbidities  [] MODERATE: 1-2 personal factors / co-morbidities  [] HIGH: 3+ personal factors / co-morbidities    Moderate / High Support Documentation: See patient medical history and objective assessment     Examination  Body Structures and Functions, activity limitations and participation restrictions that may impact the plan of care [] LOW: addressing 1-2 elements  [] MODERATE: 3+ elements  [] HIGH: 4+ elements (please support below)    Moderate / High Support Documentation: See patient medical history and objective assessment     Clinical Presentation [] LOW: stable  [] MODERATE: Evolving  [] HIGH: Unstable     Decision Making/ Complexity Score: {Desc; low/moderate/high:289100}         Short Term Goals:  {numbers 1-12:52255::"6"} weeks Status  Date Met   PAIN: Pt will report worst pain of ***/10 in order to progress toward max functional ability and improve quality of life. [x] Progressing  [] Met  [] Not Met    FUNCTION: Patient will demonstrate improved function as indicated by a functional score improvement of at least 5 points on FOTO. [x] Progressing  [] Met  [] Not Met    MOBILITY: Patient will improve AROM to 50% of stated goals, listed in objective measures above, in order to progress towards independence with functional activities.  [x] Progressing  [] Met  [] Not Met    STRENGTH: Patient will improve strength to 50% of stated goals, listed in objective measures above, in order to progress towards " "independence with functional activities. [x] Progressing  [] Met  [] Not Met    POSTURE: Patient will correct postural deviations in sitting and standing, to decrease pain and promote long term stability.  [x] Progressing  [] Met  [] Not Met    GAIT: Patient will demonstrate improved gait mechanics including *** in order to improve functional mobility, improve quality of life, and decrease risk of further injury or fall.  [x] Progressing  [] Met  [] Not Met    HEP: Patient will demonstrate independence with HEP in order to progress toward functional independence. [x] Progressing  [] Met  [] Not Met    *** [x] Progressing  [] Met  [] Not Met      Long Term Goals:  {numbers 1-12:99223::"12"} weeks Status Date Met   PAIN: Pt will report worst pain of ***/10 in order to progress toward max functional ability and improve quality of life [x] Progressing  [] Met  [] Not Met    FUNCTION: Patient will demonstrate improved function as indicated by a FOTO functional score improvement as listed in header. [x] Progressing  [] Met  [] Not Met    MOBILITY: Patient will improve AROM to stated goals, listed in objective measures above, in order to return to maximal functional potential and improve quality of life. {Set ROM goals} [x] Progressing  [] Met  [] Not Met    STRENGTH: Patient will improve strength to stated goals, listed in objective measures above, in order to improve functional independence and quality of life. {Set strength goals} [x] Progressing  [] Met  [] Not Met    GAIT: Patient will demonstrate normalized gait mechanics with minimal compensation in order to return to PLOF. [x] Progressing  [] Met  [] Not Met    Patient will return to normal ADL's, IADL's, community involvement, recreational activities, and work-related activities with less than or equal to ***/10 pain and maximal function.  [x] Progressing  [] Met  [] Not Met    *** [x] Progressing  [] Met  [] Not Met      Plan   Plan of care Certification: " "4/8/2024 to ***.    Outpatient Physical Therapy {Numbers; 1-5:33368::"2"} times weekly for {numbers 1-12:38912::"12"} weeks to include any combination of the following interventions: virtual visits, dry needling, modalities, electrical stimulation (IFC, Pre-Mod, Attended with Functional Dry Needling), {TX PLAN:06727::"Cervical/Lumbar Traction","Gait Training","Manual Therapy","Neuromuscular Re-ed","Patient Education","Self Care","Therapeutic Exercise","Therapeutic Activites"}     Julian Schmidt, PT, DPT      I CERTIFY THE NEED FOR THESE SERVICES FURNISHED UNDER THIS PLAN OF TREATMENT AND WHILE UNDER MY CARE   Physician's comments:     Physician's Signature: ___________________________________________________     "

## 2024-04-09 PROBLEM — M25.671 DECREASED RANGE OF MOTION OF RIGHT ANKLE: Status: ACTIVE | Noted: 2024-04-09

## 2024-04-09 PROBLEM — R52 PAIN AGGRAVATED BY ACTIVITIES OF DAILY LIVING: Status: ACTIVE | Noted: 2024-04-09

## 2024-04-09 NOTE — PLAN OF CARE
[facility]   Physical Therapy Initial Evaluation      Name: Ryan Rivera  Clinic Number: [id number]    Therapy Diagnosis:   Encounter Diagnoses   Name Primary?    Chronic pain of right ankle     Achilles tendinitis of right lower extremity     Decreased range of motion of right ankle Yes    Pain aggravated by activities of daily living         Physician: Ronal Bocanegra MD    Physician Orders: PT Eval and Treat   Medical Diagnosis from Referral: Chronic pain of right ankle [M25.571, G89.29], Achilles tendinitis of right lower extremity [M76.61]   Evaluation Date: 4/8/2024  Authorization Period Expiration: 12/31/24  Plan of Care Expiration: 7/8/24  Progress Note Due: 5/8/24  Visit # / Visits authorized: 1/ 1     FOTO:  Goal: 89%  -Intake: 75%  -Status: incomplete  -Discharge: incomplete    Precautions: Standard     Time In: 1502  Time Out: 1608  Total Appointment Time (timed & untimed codes): 66 minutes    Subjective     Date of onset: December    History of current condition - Ryan reports: Started burning again in December Running 1x per week at [employer]. Also doing [employer] 2 days per week. About a month ago he was doing more running, about 10 miles per week. He ran [employer] cup he had pain in the back of the heel. Gets better after warming up. Last Tuesday went back to [employer].    He also plays Beach Volleyball. Moving in the sand is problematic.     Takes ibuprofen. No recent antibiotics.    Falls:     Imaging: [x] Xray [] MRI [] CT: Performed on:   CLINICAL HISTORY:  Right ankle pain     FINDINGS:  3 views.  No acute fracture or dislocation.       Pain:  Current 0/10, worst 2/10, best 0/10   Location: [x] Right   [] Left:  Achilles  Description: Tightness  Aggravating Factors: Running, first thing in the morning  Easing Factors: activity avoidance, rest    Prior Therapy: For shoulder  Social History:    Occupation:  [employer]  Prior Level of Function: No  "limitations  Current Level of Function: Limited in athletic activity    Patients goals: Get back to running without pain     Medical History:   Past Medical History:   Diagnosis Date    Cancer 2017    Testicular ca; followed by Dr. Connell       Surgical History:   Ryan Rivera  has a past surgical history that includes Tonsillectomy (1995) and lateral orchiectomy (2017).    Medications:   Ryan has a current medication list which includes the following prescription(s): benzonatate, fexofenadine, and oseltamivir.    Allergies:   Review of patient's allergies indicates:   Allergen Reactions    Adhesive Rash        Objective      Observation:   POSTURE:    Mild pes planus  GAIT:   R foot abductory twist      FUNCTIONAL MOVEMENT PATTERNS  Movement Analysis Notes   Squat [x]Functional  []Dysfunctional:  []Painful  [x]Non-Painful            Single-leg step down assessment: (0-1 good quality of movement, 2-3 moderate, 4+ poor)         LLE RLE   1. Hands off hips   []  []    2. Trunk shift    []  [x]    3. Pelvic drop    []  []    4. Knee medial to 2nd toe  [x]  [x]    5. Lift off of medial foot  []  []    6. Loss of balance   []  [x]            1/6 3/6      RANGE OF MOTION:   Lumbar ROM Right  (spine) Left   Pain/Dysfunction with Movement Goal   Lumbar Flexion (60º) 100% ---     Lumbar Extension (30º) 100% ---     Lumbar Rotation 100% 100%         Hip AROM/PROM Right Left Pain/Dysfunction with Movement Goal   Hip Flexion (120º)       Hip Extension (30º)       Hip Abduction (45º)       Hip IR (45º)       Hip ER (45º)           Knee AROM/PROM Right Left Pain/Dysfunction with Movement Goal   Knee Flexion (135º)       Knee Extension (0º)       Tibial External Rotation (40º}       Tibial Internal Rotation (30º)           Ankle/Foot AROM/PROM Right Left Pain/Dysfunction with Movement Goal   Dorsiflexion (20º OKC, 4" CKC) 2" CKC  2" CKC Tendency to have midfoot compensation on R    Plantarflexion (50º)       Inversion " (35º) 40 40     Eversion (15º) 5 15          STRENGTH:   L/E MMT Right  (spine) Left Pain/Dysfunction with Movement Goal   Hip Flexion     4+/5 B   Hip Extension  4+/5 4+/5  4+/5 B   Hip Abduction  29# 32#  4+/5 B   Knee Extension    5/5 B   Knee Flexion    5/5 B   Hip IR    4+/5 B   Hip ER    4+/5 B   Ankle DF    5/5 B   Ankle PF 30 reps 26 reps  5/5 B   Ankle Inversion    5/5 B   Ankle Eversion    5/5 B        Joint mobility:  Impaired R ankle AP        SENSATION  [x] Intact to Light Touch   [] Impaired:      PALPATION: Structures: Increased tenderness to palpation of: right achilles insertion        Function:     Intake Outcome Measure for FOTO Ankle Survey    Therapist reviewed FOTO scores for Ryan Rivera on 4/8/2024.   FOTO documents entered into EPIC - see Media section.    Intake Score: 75%         Treatment     Total Treatment time (time-based codes) separate from Evaluation: (25) minutes     Ryan received the treatments listed below:      Ryan received the following manual therapy techniques: Joint mobilizations and Soft tissue Mobilization were applied to the: ankle for 10 minutes, including:    Ankle distraction manipulation  Ankle AP mobs      Ryan received therapeutic exercises to develop strength, endurance, ROM, flexibility, posture, and core stabilization for 15 minutes including:    HEP instruction:  Half kneel Ankle mobs with neutral foot  Tendon loading progression - isometrics, isotonics, heavy eccentrics, plyometrics      Patient Education and Home Exercises     Education provided: (5) minutes  Physical therapy diagnosis, prognosis, and plan of care.   Activity Modifications:     Written Home Exercises Provided: yes.  Exercises were reviewed and Ryan was able to demonstrate them prior to the end of the session.  Ryan demonstrated good  understanding of the education provided. See EMR under Patient Instructions for exercises provided during therapy sessions.    Assessment      Ryan is a 33 y.o. male referred to outpatient Physical Therapy with a medical diagnosis of Chronic pain of right ankle [M25.571, G89.29], Achilles tendinitis of right lower extremity [M76.61] . Clinical exam is consistent with referring diagnosis.     Problem List:  R ankle mobility deficit  Impaired foot intrinsic function  Impaired tolerance to functional load    Pt prognosis is Excellent  Pt will benefit from skilled outpatient Physical Therapy to address the deficits stated above and in the chart below, provide pt/family education, and to maximize pt's level of independence.     Plan of care discussed with patient: Yes  Pt's spiritual, cultural and educational needs considered and patient is agreeable to the plan of care and goals as stated below:     Anticipated Barriers for therapy: none    Medical Necessity is demonstrated by the following   History  Co-morbidities and personal factors that may impact the plan of care [x] LOW: no personal factors / co-morbidities  [] MODERATE: 1-2 personal factors / co-morbidities  [] HIGH: 3+ personal factors / co-morbidities    Moderate / High Support Documentation: See patient medical history and objective assessment     Examination  Body Structures and Functions, activity limitations and participation restrictions that may impact the plan of care [x] LOW: addressing 1-2 elements  [] MODERATE: 3+ elements  [] HIGH: 4+ elements (please support below)    Moderate / High Support Documentation: See patient medical history and objective assessment     Clinical Presentation [x] LOW: stable  [] MODERATE: Evolving  [] HIGH: Unstable     Decision Making/ Complexity Score: low         Short Term Goals:  6 weeks Status  Date Met   PAIN: Pt will report worst pain of 1/10 in order to progress toward max functional ability and improve quality of life. [x] Progressing  [] Met  [] Not Met    FUNCTION: Patient will demonstrate improved function as indicated by a functional score  improvement of at least 5 points on FOTO. [x] Progressing  [] Met  [] Not Met    MOBILITY: Patient will improve AROM to 50% of stated goals, listed in objective measures above, in order to progress towards independence with functional activities.  [x] Progressing  [] Met  [] Not Met    STRENGTH: Patient will improve strength to 50% of stated goals, listed in objective measures above, in order to progress towards independence with functional activities. [x] Progressing  [] Met  [] Not Met    POSTURE: Patient will correct postural deviations in sitting and standing, to decrease pain and promote long term stability.  [x] Progressing  [] Met  [] Not Met    HEP: Patient will demonstrate independence with HEP in order to progress toward functional independence. [x] Progressing  [] Met  [] Not Met      Long Term Goals:  12 weeks Status Date Met   PAIN: Pt will report worst pain of 0/10 in order to progress toward max functional ability and improve quality of life [x] Progressing  [] Met  [] Not Met    FUNCTION: Patient will demonstrate improved function as indicated by a FOTO functional score improvement as listed in header. [x] Progressing  [] Met  [] Not Met    MOBILITY: Patient will improve AROM to stated goals, listed in objective measures above, in order to return to maximal functional potential and improve quality of life.  [x] Progressing  [] Met  [] Not Met    STRENGTH: Patient will improve strength to stated goals, listed in objective measures above, in order to improve functional independence and quality of life.  [x] Progressing  [] Met  [] Not Met    GAIT: Patient will demonstrate normalized gait mechanics with minimal compensation in order to return to PLOF. [x] Progressing  [] Met  [] Not Met    Patient will return to normal ADL's, IADL's, community involvement, recreational activities, and work-related activities with less than or equal to 0/10 pain and maximal function.  [x] Progressing  [] Met  [] Not Met       Plan   Plan of care Certification: 4/8/2024 to 7/8/24.    Outpatient Physical Therapy 1 times weekly for 12 weeks to include any combination of the following interventions: virtual visits, dry needling, modalities, electrical stimulation (IFC, Pre-Mod, Attended with Functional Dry Needling), Cervical/Lumbar Traction, Gait Training, Manual Therapy, Neuromuscular Re-ed, Patient Education, Self Care, Therapeutic Exercise, and Therapeutic Activites     Julian Schmidt, PT, DPT      I CERTIFY THE NEED FOR THESE SERVICES FURNISHED UNDER THIS PLAN OF TREATMENT AND WHILE UNDER MY CARE   Physician's comments:     Physician's Signature: ___________________________________________________

## 2024-04-19 ENCOUNTER — PATIENT MESSAGE (OUTPATIENT)
Dept: RESEARCH | Facility: HOSPITAL | Age: 34
End: 2024-04-19
Payer: COMMERCIAL

## 2024-04-22 ENCOUNTER — CLINICAL SUPPORT (OUTPATIENT)
Dept: REHABILITATION | Facility: HOSPITAL | Age: 34
End: 2024-04-22
Payer: COMMERCIAL

## 2024-04-22 DIAGNOSIS — R52 PAIN AGGRAVATED BY ACTIVITIES OF DAILY LIVING: ICD-10-CM

## 2024-04-22 DIAGNOSIS — M25.671 DECREASED RANGE OF MOTION OF RIGHT ANKLE: Primary | ICD-10-CM

## 2024-04-22 PROCEDURE — 97140 MANUAL THERAPY 1/> REGIONS: CPT

## 2024-04-22 PROCEDURE — 97112 NEUROMUSCULAR REEDUCATION: CPT

## 2024-04-22 PROCEDURE — 97110 THERAPEUTIC EXERCISES: CPT

## 2024-04-22 NOTE — PROGRESS NOTES
[facility]   Physical Therapy Treatment Note        Name: Ryan Rivera  Clinic Number: [id number]    Therapy Diagnosis:   Encounter Diagnoses   Name Primary?    Decreased range of motion of right ankle Yes    Pain aggravated by activities of daily living      Physician: Ronal Bocanegra MD    Visit Date: 4/22/2024    Physician Orders: PT Eval and Treat   Medical Diagnosis from Referral: Chronic pain of right ankle [M25.571, G89.29], Achilles tendinitis of right lower extremity [M76.61]   Evaluation Date: 4/8/2024  Authorization Period Expiration: 12/31/24  Plan of Care Expiration: 7/8/24  Progress Note Due: 5/8/24  Visit # / Visits authorized: 1/ 20 (+1 for auth)      FOTO:  Goal: 89%  -Intake: 75%  -Status: incomplete  -Discharge: incomplete     Precautions: Standard   PTA Visit #: 0/5     Time In: 2:00 PM  Time Out: 3:00 PM  Total Billable Time: 50 minutes (Billing reflects 1 on 1 treatment time spent with patient)    Subjective     Patient reports: He is feeling okay today not really having pain as he has warmed up already. .    He/She was compliant with home exercise program.  Response to previous treatment: Initial Eval  Functional change: N/A    Pain: 1/10     Location: Right achilles insertion.     Objective      Objective Measures updated at progress report or POC update only unless otherwise noted.       Treatment     Ryan received the treatments listed below:         MANUAL THERAPY TECHNIQUES were applied for (10) minutes, including:    Manual Intervention Performed Today    Soft Tissue Mobilization []      Joint Mobilizations [x]   Ankle TC MOBS posteriorly grade 3-4 and TC Grade 5     []     []    Functional Dry Needling  []      Plan for Next Visit: Continue as needed           THERAPEUTIC EXERCISES to develop strength, endurance, ROM, flexibility, posture, and core stabilization for (30) minutes including:    Intervention Performed Today    Standing SL Gastroc  isometrics x  45 seconds hold x 5   Standing SL soleus isometrics x  45 second hold x 5    Seated SL soleus isometrics x  45 second hold x 5 super set with hip thrust   SL hip thrust x  5x8 each leg   Lateral Band Walks x  4 laps with Green power loop half the turf    Heel elevated box squats x  3x10 with 20# KB               Plan for Next Visit:            NEUROMUSCULAR RE-EDUCATION ACTIVITIES to improve Balance, Coordination, Kinesthetic, Sense, Proprioception, and Posture for (10) minutes.  The following were included:    Intervention Performed Today    Toe Yoga x 4 minutes   Short Foot x 4 minutes   SL balance with short foot x  30s x 3                               Plan for Next Visit:          Patient Education and Home Exercises       Home Exercises Provided and Patient Education Provided     Education provided: (Included in treatment time) minutes  PURPOSE: Patient educated on the impairments noted above and the effects of physical therapy intervention to improve overall condition and QOL.   EXERCISE: Patient was educated on all the above exercise prior/during/after for proper posture, positioning, and execution for safe performance with home exercise program.   STRENGTH: Patient educated on the importance of improved core and extremity strength in order to improve alignment of the spine and extremities with static positions and dynamic movement.   GAIT & BALANCE: Patient educated on the importance of strong core and lower extremity musculature in order to improve both static and dynamic balance, improve gait mechanics, reduce fall risk and improve household and community mobility.     Written Home Exercises Provided: yes.  Exercises were reviewed and Ryan was able to demonstrate them prior to the end of the session.  Ryan demonstrated good  understanding of the education provided. See EMR under Patient Instructions for exercises provided during therapy sessions.    Assessment     Patient tolerated  this session well with decreased reports of pain following isometrics today. He needed verbal and tactile cues for short foot and toe yoga for activation of foot intrinsics.     Ryan is progressing well towards his goals.   Patient prognosis is Excellent.     Patient will continue to benefit from skilled outpatient physical therapy to address the deficits listed in the problem list box on initial evaluation, provide pt/family education and to maximize patient's level of independence in the home and community environment.     Patient's spiritual, cultural and educational needs considered and pt agreeable to plan of care and goals.     Anticipated Barriers for therapy: none     Short Term Goals:  6 weeks Status  Date Met   PAIN: Pt will report worst pain of 1/10 in order to progress toward max functional ability and improve quality of life. [x] Progressing  [] Met  [] Not Met     FUNCTION: Patient will demonstrate improved function as indicated by a functional score improvement of at least 5 points on FOTO. [x] Progressing  [] Met  [] Not Met     MOBILITY: Patient will improve AROM to 50% of stated goals, listed in objective measures above, in order to progress towards independence with functional activities.  [x] Progressing  [] Met  [] Not Met     STRENGTH: Patient will improve strength to 50% of stated goals, listed in objective measures above, in order to progress towards independence with functional activities. [x] Progressing  [] Met  [] Not Met     POSTURE: Patient will correct postural deviations in sitting and standing, to decrease pain and promote long term stability.  [x] Progressing  [] Met  [] Not Met     HEP: Patient will demonstrate independence with HEP in order to progress toward functional independence. [x] Progressing  [] Met  [] Not Met        Long Term Goals:  12 weeks Status Date Met   PAIN: Pt will report worst pain of 0/10 in order to progress toward max functional ability and improve  quality of life [x] Progressing  [] Met  [] Not Met     FUNCTION: Patient will demonstrate improved function as indicated by a FOTO functional score improvement as listed in header. [x] Progressing  [] Met  [] Not Met     MOBILITY: Patient will improve AROM to stated goals, listed in objective measures above, in order to return to maximal functional potential and improve quality of life.  [x] Progressing  [] Met  [] Not Met     STRENGTH: Patient will improve strength to stated goals, listed in objective measures above, in order to improve functional independence and quality of life.  [x] Progressing  [] Met  [] Not Met     GAIT: Patient will demonstrate normalized gait mechanics with minimal compensation in order to return to PLOF. [x] Progressing  [] Met  [] Not Met     Patient will return to normal ADL's, IADL's, community involvement, recreational activities, and work-related activities with less than or equal to 0/10 pain and maximal function.  [x] Progressing  [] Met  [] Not Met          Plan     Continue Plan of Care (POC) and progress per patient tolerance. See treatment section for details on planned progressions next session.      Inocente Lopez, PT

## 2024-05-02 ENCOUNTER — CLINICAL SUPPORT (OUTPATIENT)
Dept: REHABILITATION | Facility: HOSPITAL | Age: 34
End: 2024-05-02
Payer: COMMERCIAL

## 2024-05-02 DIAGNOSIS — R52 PAIN AGGRAVATED BY ACTIVITIES OF DAILY LIVING: ICD-10-CM

## 2024-05-02 DIAGNOSIS — M25.671 DECREASED RANGE OF MOTION OF RIGHT ANKLE: Primary | ICD-10-CM

## 2024-05-02 PROCEDURE — 97140 MANUAL THERAPY 1/> REGIONS: CPT

## 2024-05-02 PROCEDURE — 97112 NEUROMUSCULAR REEDUCATION: CPT

## 2024-05-02 PROCEDURE — 97110 THERAPEUTIC EXERCISES: CPT

## 2024-05-02 NOTE — PROGRESS NOTES
[facility]   Physical Therapy Treatment Note        Name: Ryan Rivera  Clinic Number: [id number]    Therapy Diagnosis:   Encounter Diagnoses   Name Primary?    Decreased range of motion of right ankle Yes    Pain aggravated by activities of daily living      Physician: Ronal Bocanegra MD    Visit Date: 5/2/2024    Physician Orders: PT Eval and Treat   Medical Diagnosis from Referral: Chronic pain of right ankle [M25.571, G89.29], Achilles tendinitis of right lower extremity [M76.61]   Evaluation Date: 4/8/2024  Authorization Period Expiration: 12/31/24  Plan of Care Expiration: 7/8/24  Progress Note Due: 5/8/24  Visit # / Visits authorized: 2/ 20 (+1 for auth)      FOTO:  Goal: 89%  -Intake: 75%  -Status: incomplete  -Discharge: incomplete     Precautions: Standard   PTA Visit #: 0/5     Time In: 2:00 PM  Time Out: 3:00 PM  Total Billable Time: 55 minutes (Billing reflects 1 on 1 treatment time spent with patient)    Subjective     Patient reports: He is feeling a lot better he has been upping his running gradually and keeping up with the isometrics and he has little to no pain at all most days.    He/She was compliant with home exercise program.  Response to previous treatment: Decreased pain in achilles insertion  Functional change: Able to tolerate both more frequent and longer runs without significant pain.     Pain: 0/10     Location: Right achilles insertion.     Objective      Objective Measures updated at progress report or POC update only unless otherwise noted.       Treatment     Ryan received the treatments listed below:         MANUAL THERAPY TECHNIQUES were applied for (10) minutes, including:    Manual Intervention Performed Today    Soft Tissue Mobilization []      Joint Mobilizations [x]   Ankle TC MOBS posteriorly grade 3-4 and TC Grade 5     []     []    Functional Dry Needling  []      Plan for Next Visit: Continue as needed           THERAPEUTIC  EXERCISES to develop strength, endurance, ROM, flexibility, posture, and core stabilization for (30) minutes including:    Intervention Performed Today    Standing SL Gastroc isometrics x  45 seconds hold x 5   Seated SL soleus isometrics x  45 second hold x 5 super set with hip thrust   SL hip thrust x  5x8 each leg   Lateral Band Walks x  4 laps with Green power loop half the turf    Heel elevated box squats x  3x10 with 20# KB   Ankle power band MOBS x 20x on talus and 20x on distal fib/tibia          Plan for Next Visit:            NEUROMUSCULAR RE-EDUCATION ACTIVITIES to improve Balance, Coordination, Kinesthetic, Sense, Proprioception, and Posture for (15) minutes.  The following were included:    Intervention Performed Today    Toe Yoga x 4 minutes (2 minutes in standing DL and 2 minutes SL)   Short Foot x 4 minutes(2 minutes in standing DL and 2 minutes SL)   Towel Curls x 4 minute (2 minutes in standing DL and 2 minutes SL)   SL balance with short foot x  30s x 3                               Plan for Next Visit:          Patient Education and Home Exercises       Home Exercises Provided and Patient Education Provided     Education provided: (Included in treatment time) minutes  PURPOSE: Patient educated on the impairments noted above and the effects of physical therapy intervention to improve overall condition and QOL.   EXERCISE: Patient was educated on all the above exercise prior/during/after for proper posture, positioning, and execution for safe performance with home exercise program.   STRENGTH: Patient educated on the importance of improved core and extremity strength in order to improve alignment of the spine and extremities with static positions and dynamic movement.   GAIT & BALANCE: Patient educated on the importance of strong core and lower extremity musculature in order to improve both static and dynamic balance, improve gait mechanics, reduce fall risk and improve household and community  mobility.     Written Home Exercises Provided: yes.  Exercises were reviewed and Ryan was able to demonstrate them prior to the end of the session.  Ryan demonstrated good  understanding of the education provided. See EMR under Patient Instructions for exercises provided during therapy sessions.    Assessment     Patient tolerated this session well with no reported increase in ankle pain or discomfort. Patient advised on continuation of gradual progression of running volume and dynamic warm up routines. Progressed HEP program by adding in more single leg work moving forward.     Ryan is progressing well towards his goals.   Patient prognosis is Excellent.     Patient will continue to benefit from skilled outpatient physical therapy to address the deficits listed in the problem list box on initial evaluation, provide pt/family education and to maximize patient's level of independence in the home and community environment.     Patient's spiritual, cultural and educational needs considered and pt agreeable to plan of care and goals.     Anticipated Barriers for therapy: none     Short Term Goals:  6 weeks Status  Date Met   PAIN: Pt will report worst pain of 1/10 in order to progress toward max functional ability and improve quality of life. [x] Progressing  [] Met  [] Not Met     FUNCTION: Patient will demonstrate improved function as indicated by a functional score improvement of at least 5 points on FOTO. [x] Progressing  [] Met  [] Not Met     MOBILITY: Patient will improve AROM to 50% of stated goals, listed in objective measures above, in order to progress towards independence with functional activities.  [x] Progressing  [] Met  [] Not Met     STRENGTH: Patient will improve strength to 50% of stated goals, listed in objective measures above, in order to progress towards independence with functional activities. [x] Progressing  [] Met  [] Not Met     POSTURE: Patient will correct postural deviations in  sitting and standing, to decrease pain and promote long term stability.  [x] Progressing  [] Met  [] Not Met     HEP: Patient will demonstrate independence with HEP in order to progress toward functional independence. [x] Progressing  [] Met  [] Not Met        Long Term Goals:  12 weeks Status Date Met   PAIN: Pt will report worst pain of 0/10 in order to progress toward max functional ability and improve quality of life [x] Progressing  [] Met  [] Not Met     FUNCTION: Patient will demonstrate improved function as indicated by a FOTO functional score improvement as listed in header. [x] Progressing  [] Met  [] Not Met     MOBILITY: Patient will improve AROM to stated goals, listed in objective measures above, in order to return to maximal functional potential and improve quality of life.  [x] Progressing  [] Met  [] Not Met     STRENGTH: Patient will improve strength to stated goals, listed in objective measures above, in order to improve functional independence and quality of life.  [x] Progressing  [] Met  [] Not Met     GAIT: Patient will demonstrate normalized gait mechanics with minimal compensation in order to return to PLOF. [x] Progressing  [] Met  [] Not Met     Patient will return to normal ADL's, IADL's, community involvement, recreational activities, and work-related activities with less than or equal to 0/10 pain and maximal function.  [x] Progressing  [] Met  [] Not Met          Plan     Continue Plan of Care (POC) and progress per patient tolerance. See treatment section for details on planned progressions next session.      Inocente Lopez, PT

## 2024-05-08 ENCOUNTER — PATIENT MESSAGE (OUTPATIENT)
Dept: RESEARCH | Facility: HOSPITAL | Age: 34
End: 2024-05-08
Payer: COMMERCIAL

## 2024-05-16 ENCOUNTER — CLINICAL SUPPORT (OUTPATIENT)
Dept: REHABILITATION | Facility: HOSPITAL | Age: 34
End: 2024-05-16
Payer: COMMERCIAL

## 2024-05-16 DIAGNOSIS — M25.671 DECREASED RANGE OF MOTION OF RIGHT ANKLE: Primary | ICD-10-CM

## 2024-05-16 DIAGNOSIS — R52 PAIN AGGRAVATED BY ACTIVITIES OF DAILY LIVING: ICD-10-CM

## 2024-05-16 PROCEDURE — 97110 THERAPEUTIC EXERCISES: CPT

## 2024-05-16 PROCEDURE — 97112 NEUROMUSCULAR REEDUCATION: CPT

## 2024-05-16 NOTE — PROGRESS NOTES
[facility]   Physical Therapy Treatment Note        Name: Ryan Rivera  Clinic Number: [id number]    Therapy Diagnosis:   Encounter Diagnoses   Name Primary?    Decreased range of motion of right ankle Yes    Pain aggravated by activities of daily living        Physician: Ronal Bocanegra MD    Visit Date: 5/16/2024    Physician Orders: PT Eval and Treat   Medical Diagnosis from Referral: Chronic pain of right ankle [M25.571, G89.29], Achilles tendinitis of right lower extremity [M76.61]   Evaluation Date: 4/8/2024  Authorization Period Expiration: 12/31/24  Plan of Care Expiration: 7/8/24  Progress Note Due: 5/8/24  Visit # / Visits authorized: 3/ 20 (+1 for auth)      FOTO:  Goal: 89%  -Intake: 75%  -Status: incomplete  -Discharge: incomplete     Precautions: Standard   PTA Visit #: 0/5     Time In: 4:00 PM  Time Out: 5:00 PM  Total Billable Time: 50 minutes (Billing reflects 1 on 1 treatment time spent with patient)    Subjective     Patient reports: He is continuing to have minimal pain, and has been progressing his mileage reasonably. He still starts to get some discomfort around mile two with his runs. He has continued his HEP to help manage his symptoms.     He/She was compliant with home exercise program.  Response to previous treatment: Decreased pain in achilles insertion  Functional change: Able to tolerate both more frequent and longer runs without significant pain.     Pain: 0/10     Location: Right achilles insertion.     Objective      Objective Measures updated at progress report or POC update only unless otherwise noted.       Treatment     Ryan received the treatments listed below:         MANUAL THERAPY TECHNIQUES were applied for (0) minutes, including:    Manual Intervention Performed Today    Soft Tissue Mobilization []      Joint Mobilizations []   Ankle TC MOBS posteriorly grade 3-4 and TC Grade 5     []     []    Functional Dry Needling  []   "    THERAPEUTIC EXERCISES to develop strength, endurance, ROM, flexibility, posture, and core stabilization for (25) minutes including:    Intervention Performed Today    Standing SL Gastroc isometrics   45 seconds hold x 5   Seated SL soleus isometrics   45 second hold x 5 super set with hip thrust   SL hip thrust   5x8 each leg   Lateral Band Walks   4 laps with Green power loop half the turf    Heel elevated box squats x  3x10 with 20# KB   Ankle power band MOBS x 20x on talus and 20x on distal fib/tibia   DL heel raises with eccentric focus and fore foot elevated x 3x8 with a 3-1-3 tempo      NEUROMUSCULAR RE-EDUCATION ACTIVITIES to improve Balance, Coordination, Kinesthetic, Sense, Proprioception, and Posture for (0) minutes.  The following were included:    Intervention Performed Today    Toe Yoga  4 minutes (2 minutes in standing DL and 2 minutes SL)   Short Foot  4 minutes(2 minutes in standing DL and 2 minutes SL)   Towel Curls  4 minute (2 minutes in standing DL and 2 minutes SL)   SL balance with short foot   30s x 3                               THERAPEUTIC ACTIVITIES to improve dynamic and functional  performance for (25) minutes including:    Intervention Performed Today    Dynamic warm up x Jogging, jogging with stops and reverse then take off, jogging with single leg deceleration, bounding, jogging with lateral cuts each one lap on the turf   Broad jumps x One lap of full turf   Lateral hopping x Single leg to single leg 30x    Single leg squats to 20" box x 3x10    Heel elevated goblet squats  x 20# 3x10    Split stance calf raises x 20# KB each hand 3x10 each leg.                     Plan for Next Visit:         Patient Education and Home Exercises       Home Exercises Provided and Patient Education Provided     Education provided: (Included in treatment time) minutes  PURPOSE: Patient educated on the impairments noted above and the effects of physical therapy intervention to improve overall " condition and QOL.   EXERCISE: Patient was educated on all the above exercise prior/during/after for proper posture, positioning, and execution for safe performance with home exercise program.   STRENGTH: Patient educated on the importance of improved core and extremity strength in order to improve alignment of the spine and extremities with static positions and dynamic movement.   GAIT & BALANCE: Patient educated on the importance of strong core and lower extremity musculature in order to improve both static and dynamic balance, improve gait mechanics, reduce fall risk and improve household and community mobility.     Written Home Exercises Provided: yes.  Exercises were reviewed and Ryan was able to demonstrate them prior to the end of the session.  Ryan demonstrated good  understanding of the education provided. See EMR under Patient Instructions for exercises provided during therapy sessions.    Assessment     Patient tolerated this session well with no reported increase in ankle pain or discomfort. Progressed his program to included more jogging, jumping, and dynamic ankle activities. Progressed some activities to single leg version or modified progressing to promote increased single leg loading.     Ryan is progressing well towards his goals.   Patient prognosis is Excellent.     Patient will continue to benefit from skilled outpatient physical therapy to address the deficits listed in the problem list box on initial evaluation, provide pt/family education and to maximize patient's level of independence in the home and community environment.     Patient's spiritual, cultural and educational needs considered and pt agreeable to plan of care and goals.     Anticipated Barriers for therapy: none     Short Term Goals:  6 weeks Status  Date Met   PAIN: Pt will report worst pain of 1/10 in order to progress toward max functional ability and improve quality of life. [x] Progressing  [] Met  [] Not Met      FUNCTION: Patient will demonstrate improved function as indicated by a functional score improvement of at least 5 points on FOTO. [x] Progressing  [] Met  [] Not Met     MOBILITY: Patient will improve AROM to 50% of stated goals, listed in objective measures above, in order to progress towards independence with functional activities.  [x] Progressing  [] Met  [] Not Met     STRENGTH: Patient will improve strength to 50% of stated goals, listed in objective measures above, in order to progress towards independence with functional activities. [x] Progressing  [] Met  [] Not Met     POSTURE: Patient will correct postural deviations in sitting and standing, to decrease pain and promote long term stability.  [x] Progressing  [] Met  [] Not Met     HEP: Patient will demonstrate independence with HEP in order to progress toward functional independence. [x] Progressing  [] Met  [] Not Met        Long Term Goals:  12 weeks Status Date Met   PAIN: Pt will report worst pain of 0/10 in order to progress toward max functional ability and improve quality of life [x] Progressing  [] Met  [] Not Met     FUNCTION: Patient will demonstrate improved function as indicated by a FOTO functional score improvement as listed in header. [x] Progressing  [] Met  [] Not Met     MOBILITY: Patient will improve AROM to stated goals, listed in objective measures above, in order to return to maximal functional potential and improve quality of life.  [x] Progressing  [] Met  [] Not Met     STRENGTH: Patient will improve strength to stated goals, listed in objective measures above, in order to improve functional independence and quality of life.  [x] Progressing  [] Met  [] Not Met     GAIT: Patient will demonstrate normalized gait mechanics with minimal compensation in order to return to PLOF. [x] Progressing  [] Met  [] Not Met     Patient will return to normal ADL's, IADL's, community involvement, recreational activities, and work-related  activities with less than or equal to 0/10 pain and maximal function.  [x] Progressing  [] Met  [] Not Met          Plan     Continue Plan of Care (POC) and progress per patient tolerance. See treatment section for details on planned progressions next session.      Inocente Lopez, PT

## 2025-01-27 ENCOUNTER — OFFICE VISIT (OUTPATIENT)
Dept: DERMATOLOGY | Facility: CLINIC | Age: 35
End: 2025-01-27
Payer: COMMERCIAL

## 2025-01-27 DIAGNOSIS — D22.9 MULTIPLE BENIGN NEVI: Primary | ICD-10-CM

## 2025-01-27 DIAGNOSIS — Z12.83 SCREENING, MALIGNANT NEOPLASM, SKIN: ICD-10-CM

## 2025-01-27 PROCEDURE — G2211 COMPLEX E/M VISIT ADD ON: HCPCS | Mod: S$GLB,,, | Performed by: STUDENT IN AN ORGANIZED HEALTH CARE EDUCATION/TRAINING PROGRAM

## 2025-01-27 PROCEDURE — 1159F MED LIST DOCD IN RCRD: CPT | Mod: CPTII,S$GLB,, | Performed by: STUDENT IN AN ORGANIZED HEALTH CARE EDUCATION/TRAINING PROGRAM

## 2025-01-27 PROCEDURE — 1160F RVW MEDS BY RX/DR IN RCRD: CPT | Mod: CPTII,S$GLB,, | Performed by: STUDENT IN AN ORGANIZED HEALTH CARE EDUCATION/TRAINING PROGRAM

## 2025-01-27 PROCEDURE — 99999 PR PBB SHADOW E&M-EST. PATIENT-LVL III: CPT | Mod: PBBFAC,,, | Performed by: STUDENT IN AN ORGANIZED HEALTH CARE EDUCATION/TRAINING PROGRAM

## 2025-01-27 PROCEDURE — 99203 OFFICE O/P NEW LOW 30 MIN: CPT | Mod: S$GLB,,, | Performed by: STUDENT IN AN ORGANIZED HEALTH CARE EDUCATION/TRAINING PROGRAM

## 2025-01-27 NOTE — PROGRESS NOTES
Subjective:       Patient ID:  Ryan Rivera is a 34 y.o. male who presents for   Chief Complaint   Patient presents with    Skin Check     [facility] Pt concern of the skin lesion located on the left foot X 1-2 years s/s changing in color Tx none      History of Present Illness: The patient presents with chief complaint of a skin examination and mole check. Had a physical at work and it was noted to have a mole on the bottom of the right foot. Unsure how long it has been present. Denies any pain or other symptoms with the lesion. Denies any other rapidly growing, bleeding or non healing skin lesions. No history of skin cancer or melanoma.         Review of Systems   Constitutional:  Negative for fever and chills.   Skin:  Negative for itching, rash and dry skin.        Objective:    Physical Exam   Constitutional: He appears well-developed and well-nourished. No distress.   Neurological: He is alert and oriented to person, place, and time. He is not disoriented.   Psychiatric: He has a normal mood and affect.   Skin:   Areas Examined (abnormalities noted in diagram):   Scalp / Hair Palpated and Inspected  Head / Face Inspection Performed  Neck Inspection Performed  Chest / Axilla Inspection Performed  Abdomen Inspection Performed  Genitals / Buttocks / Groin Inspection Performed  Back Inspection Performed  RUE Inspected  LUE Inspection Performed  RLE Inspected  LLE Inspection Performed  Nails and Digits Inspection Performed                  Diagram Legend     Erythematous scaling macule/papule c/w actinic keratosis       Vascular papule c/w angioma      Pigmented verrucoid papule/plaque c/w seborrheic keratosis      Yellow umbilicated papule c/w sebaceous hyperplasia      Irregularly shaped tan macule c/w lentigo     1-2 mm smooth white papules consistent with Milia      Movable subcutaneous cyst with punctum c/w epidermal inclusion cyst      Subcutaneous movable cyst c/w pilar cyst      Firm pink to brown  papule c/w dermatofibroma      Pedunculated fleshy papule(s) c/w skin tag(s)      Evenly pigmented macule c/w junctional nevus     Mildly variegated pigmented, slightly irregular-bordered macule c/w mildly atypical nevus      Flesh colored to evenly pigmented papule c/w intradermal nevus       Pink pearly papule/plaque c/w basal cell carcinoma      Erythematous hyperkeratotic cursted plaque c/w SCC      Surgical scar with no sign of skin cancer recurrence      Open and closed comedones      Inflammatory papules and pustules      Verrucoid papule consistent consistent with wart     Erythematous eczematous patches and plaques     Dystrophic onycholytic nail with subungual debris c/w onychomycosis     Umbilicated papule    Erythematous-base heme-crusted tan verrucoid plaque consistent with inflamed seborrheic keratosis     Erythematous Silvery Scaling Plaque c/w Psoriasis     See annotation      Assessment / Plan:        Multiple benign nevi    Screening, malignant neoplasm, skin             Follow up in about 1 year (around 1/27/2026).

## 2025-04-02 ENCOUNTER — APPOINTMENT (OUTPATIENT)
Dept: LAB | Facility: HOSPITAL | Age: 35
End: 2025-04-02
Attending: FAMILY MEDICINE
Payer: COMMERCIAL

## 2025-04-02 ENCOUNTER — OFFICE VISIT (OUTPATIENT)
Dept: INTERNAL MEDICINE | Facility: CLINIC | Age: 35
End: 2025-04-02
Payer: COMMERCIAL

## 2025-04-02 VITALS
OXYGEN SATURATION: 95 % | BODY MASS INDEX: 26.49 KG/M2 | SYSTOLIC BLOOD PRESSURE: 122 MMHG | WEIGHT: 174.81 LBS | DIASTOLIC BLOOD PRESSURE: 70 MMHG | HEIGHT: 68 IN | HEART RATE: 75 BPM

## 2025-04-02 DIAGNOSIS — Z00.00 ANNUAL PHYSICAL EXAM: Primary | ICD-10-CM

## 2025-04-02 DIAGNOSIS — R19.5 ACHOLIC STOOL: ICD-10-CM

## 2025-04-02 PROBLEM — M25.671 DECREASED RANGE OF MOTION OF RIGHT ANKLE: Status: RESOLVED | Noted: 2024-04-09 | Resolved: 2025-04-02

## 2025-04-02 PROBLEM — R52 PAIN AGGRAVATED BY ACTIVITIES OF DAILY LIVING: Status: RESOLVED | Noted: 2024-04-09 | Resolved: 2025-04-02

## 2025-04-02 PROCEDURE — 99999 PR PBB SHADOW E&M-EST. PATIENT-LVL III: CPT | Mod: PBBFAC,,, | Performed by: FAMILY MEDICINE

## 2025-04-02 NOTE — PROGRESS NOTES
Subjective:   Patient ID: Ryan Rivera is a 34 y.o. male.  Chief Complaint:  Fatigue    Presents for evaluation of recent change in stool color with mild back pain in fatigue related issues  No recent MD visit   No recent labs on file   Overdue for annual exam    History testicular cancer with orchiectomy  No current medications   Allergies to adhesive but no medications   Tetanus booster up-to-date, no recent flu vaccine, COVID booster not up-to-date no recent COVID    Reports in the past week stools have turned pale/george-colored   Other than fatigue in midline thoracic/low back pain no other constitutional symptoms   Specifically no fever, chills, nausea, vomiting, or diarrhea  No abdominal pain  No weight loss  Denies any change in diet    Review of Systems   Constitutional:  Positive for fatigue. Negative for chills and fever.   HENT:  Negative for congestion, dental problem, ear discharge, ear pain, postnasal drip, rhinorrhea, sinus pressure, sinus pain, sore throat and trouble swallowing.    Eyes:  Negative for pain, redness and visual disturbance.   Respiratory:  Negative for cough, chest tightness, shortness of breath and wheezing.    Cardiovascular:  Negative for chest pain, palpitations and leg swelling.   Gastrointestinal:  Negative for abdominal distention, abdominal pain, blood in stool, constipation, diarrhea, nausea and vomiting.   Endocrine: Negative for polydipsia, polyphagia and polyuria.   Genitourinary:  Negative for difficulty urinating, dysuria, flank pain, frequency, hematuria and urgency.   Musculoskeletal:  Positive for back pain. Negative for myalgias.   Skin:  Negative for rash.   Neurological:  Negative for dizziness, weakness, light-headedness and headaches.   Hematological:  Negative for adenopathy.   Psychiatric/Behavioral:  Negative for sleep disturbance. The patient is not nervous/anxious.        Objective:   /70 (BP Location: Right arm, Patient Position: Sitting)    "Pulse 75   Ht 5' 8" (1.727 m)   Wt 79.3 kg (174 lb 13.2 oz)   SpO2 95%   BMI 26.58 kg/m²     Physical Exam  Vitals and nursing note reviewed.   Constitutional:       Appearance: Normal appearance. He is well-developed and normal weight.   HENT:      Right Ear: Tympanic membrane and ear canal normal.      Left Ear: Tympanic membrane and ear canal normal.      Nose: No congestion or rhinorrhea.      Mouth/Throat:      Pharynx: Oropharynx is clear. Uvula midline. No pharyngeal swelling, oropharyngeal exudate or posterior oropharyngeal erythema.   Neck:      Thyroid: No thyroid mass, thyromegaly or thyroid tenderness.   Cardiovascular:      Rate and Rhythm: Normal rate and regular rhythm.      Heart sounds: Normal heart sounds.   Pulmonary:      Effort: Pulmonary effort is normal.      Breath sounds: Normal breath sounds.   Abdominal:      General: There is no distension.      Palpations: Abdomen is soft.      Tenderness: There is no abdominal tenderness. There is no right CVA tenderness, left CVA tenderness, guarding or rebound.   Skin:     Findings: No rash.   Psychiatric:         Mood and Affect: Mood and affect normal.       Assessment:       ICD-10-CM ICD-9-CM   1. Annual physical exam  Z00.00 V70.0   2. Acholic stool  R19.5 787.7     Plan:   Annual physical exam  -     CBC Auto Differential; Future; Expected date: 04/02/2025  -     Renal Function Panel; Future; Expected date: 04/02/2025  -     Hepatic Function Panel; Future; Expected date: 04/02/2025  -     Lipid Panel; Future; Expected date: 04/02/2025  -     Hemoglobin A1C; Future; Expected date: 04/02/2025  -     TSH; Future; Expected date: 04/02/2025  -     Urinalysis, Reflex to Urine Culture; Future; Expected date: 04/02/2025  Blood pressure normal.  BMI 26.5.    Check labs.  Treat as indicated.    Colon cancer screening at 45 years old   Prostate cancer screening at 40 years old   Tetanus booster up-to-date   Declines COVID booster   Declines flu vaccine "     Acholic stool  -     Sedimentation rate; Future; Expected date: 04/02/2025  -     C-Reactive Protein; Future; Expected date: 04/02/2025  -     Amylase; Future; Expected date: 04/02/2025  -     Lipase; Future; Expected date: 04/02/2025  Most likely benign in nature   In addition to annual lab work check labs above   Additional evaluation and treatment as indicated     Return to clinic 1 year for annual exam or sooner as needed

## 2025-04-03 ENCOUNTER — RESULTS FOLLOW-UP (OUTPATIENT)
Dept: INTERNAL MEDICINE | Facility: CLINIC | Age: 35
End: 2025-04-03